# Patient Record
Sex: MALE | Race: WHITE | Employment: FULL TIME | URBAN - METROPOLITAN AREA
[De-identification: names, ages, dates, MRNs, and addresses within clinical notes are randomized per-mention and may not be internally consistent; named-entity substitution may affect disease eponyms.]

---

## 2018-02-06 ENCOUNTER — OFFICE VISIT (OUTPATIENT)
Dept: SLEEP MEDICINE | Facility: CLINIC | Age: 48
End: 2018-02-06
Payer: COMMERCIAL

## 2018-02-06 VITALS
WEIGHT: 208 LBS | DIASTOLIC BLOOD PRESSURE: 80 MMHG | RESPIRATION RATE: 16 BRPM | SYSTOLIC BLOOD PRESSURE: 124 MMHG | HEART RATE: 86 BPM | HEIGHT: 66 IN | BODY MASS INDEX: 33.43 KG/M2 | OXYGEN SATURATION: 94 %

## 2018-02-06 DIAGNOSIS — G47.33 OSA ON CPAP: Primary | ICD-10-CM

## 2018-02-06 PROCEDURE — 99214 OFFICE O/P EST MOD 30 MIN: CPT | Performed by: INTERNAL MEDICINE

## 2018-02-06 NOTE — NURSING NOTE
"Chief Complaint   Patient presents with     CPAP Follow Up       Initial /80  Pulse 86  Resp 16  Ht 1.676 m (5' 6\")  Wt 94.3 kg (208 lb)  SpO2 94%  BMI 33.57 kg/m2 Estimated body mass index is 33.57 kg/(m^2) as calculated from the following:    Height as of this encounter: 1.676 m (5' 6\").    Weight as of this encounter: 94.3 kg (208 lb).  Medication Reconciliation: complete     BRADLEY Oliva        "

## 2018-02-06 NOTE — PATIENT INSTRUCTIONS

## 2018-02-06 NOTE — MR AVS SNAPSHOT
After Visit Summary   2/6/2018    Ryan Caba    MRN: [id number]           Patient Information     Date Of Birth          1970        Visit Information        Provider Department      2/6/2018 9:30 AM Charlene Martin MD [facility], [place], Sleep Study        Today's Diagnoses     DAMARIS on CPAP    -  1      Care Instructions      Your BMI is Body mass index is 33.57 kg/(m^2).  Weight management is a personal decision.  If you are interested in exploring weight loss strategies, the following discussion covers the approaches that may be successful. Body mass index (BMI) is one way to tell whether you are at a healthy weight, overweight, or obese. It measures your weight in relation to your height.  A BMI of 18.5 to 24.9 is in the healthy range. A person with a BMI of 25 to 29.9 is considered overweight, and someone with a BMI of 30 or greater is considered obese. More than two-thirds of American adults are considered overweight or obese.  Being overweight or obese increases the risk for further weight gain. Excess weight may lead to heart disease and diabetes.  Creating and following plans for healthy eating and physical activity may help you improve your health.  Weight control is part of healthy lifestyle and includes exercise, emotional health, and healthy eating habits. Careful eating habits lifelong are the mainstay of weight control. Though there are significant health benefits from weight loss, long-term weight loss with diet alone may be very difficult to achieve- studies show long-term success with dietary management in less than 10% of people. Attaining a healthy weight may be especially difficult to achieve in those with severe obesity. In some cases, medications, devices and surgical management might be considered.  What can you do?  If you are overweight or obese and are interested in methods for weight loss, you should discuss this with your provider.     Consider  reducing daily calorie intake by 500 calories.     Keep a food journal.     Avoiding skipping meals, consider cutting portions instead.    Diet combined with exercise helps maintain muscle while optimizing fat loss. Strength training is particularly important for building and maintaining muscle mass. Exercise helps reduce stress, increase energy, and improves fitness. Increasing exercise without diet control, however, may not burn enough calories to loose weight.       Start walking three days a week 10-20 minutes at a time    Work towards walking thirty minutes five days a week     Eventually, increase the speed of your walking for 1-2 minutes at time    In addition, we recommend that you review healthy lifestyles and methods for weight loss available through the National Institutes of Health patient information sites:  http://win.niddk.nih.gov/publications/index.htm    And look into health and wellness programs that may be available through your health insurance provider, employer, local community center, or mitul club.    Weight management plan: Patient was referred to their PCP to discuss a diet and exercise plan.              Follow-ups after your visit        Follow-up notes from your care team     Return in about 1 year (around 2/6/2019).      Your next 10 appointments already scheduled     Feb 19, 2018  9:30 AM CST   Oximetry  with SLEEP STUDY RM 7   [facility], Sleep Study ([facility])    [address] [place] [id number] [phone]            Feb 20, 2018  8:00 AM CST   Oximetry Drop Off with DME SCHEDULE   [facility], Sleep Study ([facility])    [address] [place] [id number] [phone]              Future tests that were ordered for you today     Open Future Orders        Priority Expected Expires Ordered    Overnight oximetry study Routine  8/5/2018 2/6/2018           "  Who to contact     If you have questions or need follow up information about today's clinic visit or your schedule please contact [facility] directly at [phone].  Normal or non-critical lab and imaging results will be communicated to you by MyChart, letter or phone within 4 business days after the clinic has received the results. If you do not hear from us within 7 days, please contact the clinic through MyChart or phone. If you have a critical or abnormal lab result, we will notify you by phone as soon as possible.  Submit refill requests through Panjiva or call your pharmacy and they will forward the refill request to us. Please allow 3 business days for your refill to be completed.          Additional Information About Your Visit        Panjiva Information     Panjiva lets you send messages to your doctor, view your test results, renew your prescriptions, schedule appointments and more. To sign up, go to [email or web address] . Click on \"Log in\" on the left side of the screen, which will take you to the Welcome page. Then click on \"Sign up Now\" on the right side of the page.     You will be asked to enter the access code listed below, as well as some personal information. Please follow the directions to create your username and password.     Your access code is: [email or web address]  Expires: 2018 10:20 AM     Your access code will  in 90 days. If you need help or a new code, please call your [facility] clinic or [phone].        Care EveryWhere ID     This is your Care EveryWhere ID. This could be used by other organizations to access your [facility] medical records  [id number]        Your Vitals Were     Pulse Respirations Height Pulse Oximetry BMI (Body Mass Index)       86 16 1.676 m (5' 6\") 94% 33.57 kg/m2        Blood Pressure from Last 3 Encounters:   18 124/80   10/27/15 127/74   10/13/15 125/80    Weight from Last 3 Encounters:   18 94.3 kg (208 lb)   10/13/15 " 89.8 kg (198 lb)              We Performed the Following     Comprehensive DME        Primary Care Provider Fax #    Physician No Ref-Primary [phone]       No address on file        Equal Access to Services     ANGEL SLAUGHTERHARSHA : Sebastian mitchell michael yanick Sweeney, jonnathan whatley, aurelia zuñiga, darryl lomeli. So [facility] [phone].    ATENCIÓN: Si habla español, tiene a chavez disposición servicios gratuitos de asistencia lingüística. Llame al [phone].    We comply with applicable federal civil rights laws and [place] laws. We do not discriminate on the basis of race, color, national origin, age, disability, sex, sexual orientation, or gender identity.            Thank you!     Thank you for choosing [facility] [place], SLEEP STUDY  for your care. Our goal is always to provide you with excellent care. Hearing back from our patients is one way we can continue to improve our services. Please take a few minutes to complete the written survey that you may receive in the mail after your visit with us. Thank you!             Your Updated Medication List - Protect others around you: Learn how to safely use, store and throw away your medicines at www.disposemymeds.org.          This list is accurate as of 2/6/18 10:21 AM.  Always use your most recent med list.                   Brand Name Dispense Instructions for use Diagnosis    order for DME      Equipment being ordered: CPAP Patient Ryan Goodetheresa was set up at [facility] on October 29, 2015. Patient received a Resmed AirSense 10 Auto. Pressures were set at Auto 7 - 15 cm H2O.   Patient?s ramp is 5 cm H2O for Auto and FLEX/EPR is A Flex.  Patient received a Resmed Mask name: PENA FX  Pillow mask Size Large, heated tubing and heated humidifier.  Patient is enrolled in the STM Program and does need to meet compliance. Patient has a follow up on TBD with Dr. Martin.  Haylie Wynn

## 2018-02-06 NOTE — PROGRESS NOTES
"   SLEEP MEDICINE FOLLOW-UP VISIT      DATE OF VISIT:  02/06/2018       CHIEF COMPLAINT / PURPOSE OF VISIT:  Followup of obstructive sleep apnea (DAMARIS).      HISTORY OF PRESENT ILLNESS:  Mr. Ryan Caba is a 47-year-old male who presents to the Sleep Clinic today for followup of DAMARIS.  He underwent HSP on 10/26/2016.  At that time his BMI was 32 kg/m2.  He was noted to have an apnea-hypopnea index (AHI) of 13.0 per hour, 14.2 per hour in supine sleep position.  There was also evidence of mild sleep-associated hypoxemia during the HST, he spent in 6.2 minutes with oxygen saturations below 88%.  He was subsequently prescribed an auto-CPAP device, which he continues to use during sleep, and he is here today for a followup of DAMARIS.  He is currently being treated with auto-CPAP with pressure settings between 7 to 15 cm of water.  He reports using the CPAP regularly during sleep.  There have not been any concerns about snoring or awakenings due to gasping for air or choking sensation while he is using the CPAP.  He denies air hunger.  He reports good sleep quality with the continued use of the device.  He denies fatigue or excessive daytime sleepiness.  He denies any drowsiness while driving.  He endorses an [place] Sleepiness score of 1 out of 24.    Downloadable compliance data for the past one month shows that he has used the device for 30 out of 30 days with an average daily usage of 7 hours and 26 minutes on the nights used.  Median pressure was 10.2 cm of water, 95th percentile was 13.7 cm of water, and max pressure was 14.6 cm of water.  The 95th percentile air leak was 17.5 liters per minute, residual AHI was 1.7 per hour.     Current meds, Past medical history, Past surgical history, Allergies, Social history, Family history: reviewed, per EMR    PHYSICAL EXAMINATION:   VITAL SIGNS:/80  Pulse 86  Resp 16  Ht 1.676 m (5' 6\")  Wt 94.3 kg (208 lb)  SpO2 94%  BMI 33.57 kg/m2  GENERAL APPEARANCE:  Normal, " "in no apparent cardiopulmonary distress.   CARDIOVASCULAR:  Regular S1 and S2.  No gallops or murmurs.   RESPIRATORY:  Clear to auscultation bilaterally with no rales or rhonchi.      IMPRESSION / REPORT / PLAN:   Previously diagnosed mild obstructive sleep apnea.  The patient reports adequate compliance with the CPAP device, and reports positive benefits from the device usage.  Based on the compliance measures, obstructive sleep apnea appears to be adequately controlled with the CPAP at the current pressure setting.  However, based on the compliance measures, even though he does not report air hunger, I recommended increasing the minimum pressure setting to 9 cm of water and the max pressure to 16 cm of water.  He was instructed to get back to me in case he has any trouble tolerating the revised pressure settings and he agreed.     Recommended obtaining overnight oximetry(JUDY)  with revised CPAP settings along with parallel compliance DL to check for resolution of hypoxemia which was noted during the HST. Plan to call pt to discuss results of JUDY pt was okay with leaving voice message on his cell phone [phone].    Prescription was provided for renewal of all the CPAP supplies.  He was recommended to continue using the CPAP regularly during sleep, and was instructed to get the supplies for the device regularly replaced as well.  We discussed weight management with diet and exercise.  He was recommended to avoid driving or operating heavy machinery if drowsy or sleepy, to prevent accidents.       If the oximetry is normal, the plan is for him to follow up at the Sleep Clinic in one year, or sooner if there are any concerns, and he agreed.      The above note was dictated using voice recognition software. Although reviewed after completion, some word and grammatical error may remain . Please contact the author for any clarifications.    \"I spent a total of 25  minutes face to face with Ryan Caba during " "today's office visit. Over 50% of this time was spent counseling the patient and  coordinating care regarding sleep apnea, CPAP treatment, and weight management.\"       Charlene Martin MD   of Medicine,  Division of Pulmonary/Sleep Medicine  [facility].      D: 2018   T: 2018   MT: KRISTAL      Name:     DENISE MCCANN   MRN:      [id number]        Account:      [id number]   :      1970           Visit Date:   2018      Document: [id number]      "

## 2018-02-20 ENCOUNTER — DOCUMENTATION ONLY (OUTPATIENT)
Dept: SLEEP MEDICINE | Facility: CLINIC | Age: 48
End: 2018-02-20

## 2018-02-20 ENCOUNTER — OFFICE VISIT (OUTPATIENT)
Dept: SLEEP MEDICINE | Facility: CLINIC | Age: 48
End: 2018-02-20
Attending: INTERNAL MEDICINE
Payer: COMMERCIAL

## 2018-02-20 DIAGNOSIS — G47.33 OSA ON CPAP: ICD-10-CM

## 2018-02-20 NOTE — PROGRESS NOTES
Patient presented to clinic for  and demonstration of the overnight oximetry. Patient was set up and instructed use. Patient verbalized understanding and will be returning device by 10 am tomorrow    Patient was given sleep logs and written instructions for use    BRADLEY Shah  Sleep Clinic-Specialist,    Registered Medical Assistant   [facility]

## 2018-02-20 NOTE — MR AVS SNAPSHOT
"              After Visit Summary   2/20/2018    Ryan Caba    MRN: [id number]           Patient Information     Date Of Birth          1970        Visit Information        Provider Department      2/20/2018 10:00 AM [facility] STUDY RM [facility]        Today's Diagnoses     DAMARIS on CPAP           Follow-ups after your visit        Your next 10 appointments already scheduled     Feb 21, 2018  8:45 AM CST   Oximetry Drop Off with DME SCHEDULE   [facility], Sleep Study ([facility])    [address] [place] [id number] [phone]            Feb 27, 2018  9:30 AM CST   Return Sleep Patient with Charlene Martin MD   [facility], Sleep Study ([facility])    [address] [place] [id number] [phone]              Who to contact     If you have questions or need follow up information about today's clinic visit or your schedule please contact [facility] directly at [phone].  Normal or non-critical lab and imaging results will be communicated to you by Tus reQRdoshart, letter or phone within 4 business days after the clinic has received the results. If you do not hear from us within 7 days, please contact the clinic through EdgeConneXt or phone. If you have a critical or abnormal lab result, we will notify you by phone as soon as possible.  Submit refill requests through HammerKit or call your pharmacy and they will forward the refill request to us. Please allow 3 business days for your refill to be completed.          Additional Information About Your Visit        Tus reQRdosharStockStreams Information     HammerKit lets you send messages to your doctor, view your test results, renew your prescriptions, schedule appointments and more. To sign up, go to [email or web address] . Click on \"Log in\" on the left side of the screen, which will take you to the Welcome page. " "Then click on \"Sign up Now\" on the right side of the page.     You will be asked to enter the access code listed below, as well as some personal information. Please follow the directions to create your username and password.     Your access code is: [email or web address]  Expires: 2018 10:20 AM     Your access code will  in 90 days. If you need help or a new code, please call your [facility] clinic or [phone].        Care EveryWhere ID     This is your Care EveryWhere ID. This could be used by other organizations to access your [facility] medical records  [id number]         Blood Pressure from Last 3 Encounters:   18 124/80   10/27/15 127/74   10/13/15 125/80    Weight from Last 3 Encounters:   18 94.3 kg (208 lb)   10/13/15 89.8 kg (198 lb)              We Performed the Following     Overnight oximetry study        Primary Care Provider Fax #    Physician No Ref-Primary [phone]       No address on file        Equal Access to Services     [facility]: Hadii aad ku hadasho Sochiquis, waaxda luqadaha, qaybta kaalmada adeegyaenio, darryl kruger . So [facility] [phone].    ATENCIÓN: Si habla español, tiene a chavez disposición servicios gratuitos de asistencia lingüística. Llame al [phone].    We comply with applicable federal civil rights laws and [place] laws. We do not discriminate on the basis of race, color, national origin, age, disability, sex, sexual orientation, or gender identity.            Thank you!     Thank you for choosing [facility], SLEEP STUDY  for your care. Our goal is always to provide you with excellent care. Hearing back from our patients is one way we can continue to improve our services. Please take a few minutes to complete the written survey that you may receive in the mail after your visit with us. Thank you!             Your Updated Medication List - Protect others around you: Learn how to safely use, store and throw away your medicines at " www.disposemymeds.org.          This list is accurate as of 2/20/18 10:08 AM.  Always use your most recent med list.                   Brand Name Dispense Instructions for use Diagnosis    order for DME      Equipment being ordered: CPAP Patient Ryan Caba was set up at [facility] on October 29, 2015. Patient received a Resmed AirSense 10 Auto. Pressures were set at Auto 7 - 15 cm H2O.   Patient?s ramp is 5 cm H2O for Auto and FLEX/EPR is A Flex.  Patient received a Resmed Mask name: PENA FX  Pillow mask Size Large, heated tubing and heated humidifier.  Patient is enrolled in the STM Program and does need to meet compliance. Patient has a follow up on TBD with Dr. Martin.  Haylie Wynn

## 2018-02-21 ENCOUNTER — DOCUMENTATION ONLY (OUTPATIENT)
Dept: SLEEP MEDICINE | Facility: CLINIC | Age: 48
End: 2018-02-21
Payer: COMMERCIAL

## 2018-02-21 NOTE — PROGRESS NOTES
Results received from [employer] for overnight oximetry that was picked up on 02/20/2018.     Results labeled and scanned into chart.     Copy given to provider for review. Encounter also forwarded to provider.     Recording Date: 02/20/2018    Duration: 7:45:24    Note: Completed on Current PAP Settings.     Compliance download included as well.

## 2018-02-26 NOTE — PROCEDURES
Overnight oximetry was done on 2/20/18 with  Auto CPAP 7-16 cm water. (valid recording time: 7 hours and 45 minutes)  The oximetry was essentially normal. Baseline oxygen saturation was 94.1%, lowest oxygen saturation was 87%. Time  with oxygen saturations below 88% was 0.1 minutes.   Based on the parallel compliance DL (AHI 1.9 per hr) with  the current APAP setting, and the oximetry findings, the sleep disordered breathing is  adequately controlled.  Recommendations:   If patient is comfortable with the current pressure settings and does  not report air hunger, plan will be to to continue using the CPAP regularly during sleep and get regular equipment replacement.   The minimum pressure settings will be increased if patient  reports air hunger, and  he will continue using the CPAP regularly during sleep and get regular equipment replacement.     F/U on yearly basis or sooner  if there is problems with device usage/recurrence of snoring or fatigue or excessive daytime sleepiness.    Charlene Martin MD    of Medicine,   Division of Pulmonary/Sleep Medicine   [facility]

## 2019-04-29 ENCOUNTER — OFFICE VISIT (OUTPATIENT)
Dept: SLEEP MEDICINE | Facility: CLINIC | Age: 49
End: 2019-04-29
Payer: COMMERCIAL

## 2019-04-29 VITALS
SYSTOLIC BLOOD PRESSURE: 132 MMHG | BODY MASS INDEX: 32.3 KG/M2 | OXYGEN SATURATION: 93 % | DIASTOLIC BLOOD PRESSURE: 85 MMHG | HEART RATE: 101 BPM | HEIGHT: 66 IN | RESPIRATION RATE: 20 BRPM | WEIGHT: 201 LBS

## 2019-04-29 DIAGNOSIS — G47.33 OSA ON CPAP: Primary | ICD-10-CM

## 2019-04-29 PROCEDURE — 99214 OFFICE O/P EST MOD 30 MIN: CPT | Performed by: INTERNAL MEDICINE

## 2019-04-29 ASSESSMENT — MIFFLIN-ST. JEOR: SCORE: 1729.23

## 2019-04-29 NOTE — PATIENT INSTRUCTIONS
Your BMI is Body mass index is 32.46 kg/m .  Weight management is a personal decision.  If you are interested in exploring weight loss strategies, the following discussion covers the approaches that may be successful. Body mass index (BMI) is one way to tell whether you are at a healthy weight, overweight, or obese. It measures your weight in relation to your height.  A BMI of 18.5 to 24.9 is in the healthy range. A person with a BMI of 25 to 29.9 is considered overweight, and someone with a BMI of 30 or greater is considered obese. More than two-thirds of American adults are considered overweight or obese.  Being overweight or obese increases the risk for further weight gain. Excess weight may lead to heart disease and diabetes.  Creating and following plans for healthy eating and physical activity may help you improve your health.  Weight control is part of healthy lifestyle and includes exercise, emotional health, and healthy eating habits. Careful eating habits lifelong are the mainstay of weight control. Though there are significant health benefits from weight loss, long-term weight loss with diet alone may be very difficult to achieve- studies show long-term success with dietary management in less than 10% of people. Attaining a healthy weight may be especially difficult to achieve in those with severe obesity. In some cases, medications, devices and surgical management might be considered.  What can you do?  If you are overweight or obese and are interested in methods for weight loss, you should discuss this with your provider.     Consider reducing daily calorie intake by 500 calories.     Keep a food journal.     Avoiding skipping meals, consider cutting portions instead.    Diet combined with exercise helps maintain muscle while optimizing fat loss. Strength training is particularly important for building and maintaining muscle mass. Exercise helps reduce stress, increase energy, and improves fitness.  Increasing exercise without diet control, however, may not burn enough calories to loose weight.       Start walking three days a week 10-20 minutes at a time    Work towards walking thirty minutes five days a week     Eventually, increase the speed of your walking for 1-2 minutes at time    In addition, we recommend that you review healthy lifestyles and methods for weight loss available through the National Institutes of Health patient information sites:  http://win.niddk.nih.gov/publications/index.htm    And look into health and wellness programs that may be available through your health insurance provider, employer, local community center, or mitul club.    Weight management plan: Patient was referred to their PCP to discuss a diet and exercise plan.

## 2019-04-29 NOTE — NURSING NOTE
Pressure changed done to a new setting of 8 Min and 16 Max.    All other orders sent to [facility]    Selin Rodrigez [facility] ~[place]

## 2019-04-29 NOTE — PROGRESS NOTES
SLEEP MEDICINE FOLLOW-UP VISIT      DATE OF VISIT:   April 29, 2019     PURPOSE OF VISIT:  Followup of obstructive sleep apnea (DAMARIS) /Review CPAP compliance measures     HISTORY OF PRESENT ILLNESS:  Mr. Ryan Caba is a 48-year-old male who presents to the Sleep Clinic today for followup of DAMARIS.   ( HST on 10/26/2016 that showed an apnea-hypopnea index (AHI) of 13.0 per hour, 14.2 per hour in supine sleep position.  There was also evidence of mild sleep-associated hypoxemia during the HST, he spent in 6.2 minutes with oxygen saturations below 88%, which resolved with CPAP treatment based on the overnight oximetry obtained on 2/20/2018).     He is currently being treated with auto-CPAP with pressure settings between 7 to 15 cm of water.  He reports using the CPAP regularly during sleep.  There have not been any concerns about snoring or awakenings due to gasping for air or choking sensation with the CPAP.  He denies air hunger.  He reports good sleep quality with the  device.  He denies fatigue or excessive daytime sleepiness.  He denies any drowsiness while driving.  He endorses an [place] Sleepiness score of 2 out of 24.      Downloadable compliance data for the past one month shows that he has used the device for 29 out of 30 days with an average daily usage of 7 hours and 17 minutes on the nights used.  Median pressure was 10 cm of water, 95th percentile was 12.8 cm of water, and max pressure was 14.4 cm of water.  The 95th percentile air leak was 22.3 liters per minute, residual AHI was 2.2 per hour.       Current meds:  Current Outpatient Medications   Medication Sig Dispense Refill     order for DME Equipment being ordered: CPAP  Patient Ryan Caba was set up at [place] on October 29, 2015. Patient received a Resmed AirSense 10 Auto. Pressures were set at Auto 7 - 15 cm H2O.   Patient s ramp is 5 cm H2O for Auto and FLEX/EPR is A Flex.  Patient received a Resmed Mask name: PENA FX  Pillow mask  "Size Large, heated tubing and heated humidifier.  Patient is enrolled in the STM Program and does need to meet compliance. Patient has a follow up on TBD with Dr. Martin.   Haylie Wynn       Past medical history, Past surgical history, Allergies, Social history, Family history: reviewed, per EMR     PHYSICAL EXAMINATION:   VITAL SIGNS:/85   Pulse 101   Resp 20   Ht 1.676 m (5' 5.98\")   Wt 91.2 kg (201 lb)   SpO2 93%   BMI 32.46 kg/m    GENERAL APPEARANCE: Well-built, well-nourished,  in no apparent distress  Pt is dressed casually, cooperative with good eye contact.   Speech is spontaneous with regular rate and volume.   Mood: euthymic; affect congruent with full range and intensity.   Sensorium: awake, alert and oriented to person, place, time, and situation.    IMPRESSION / REPORT / PLAN:   Previously diagnosed mild obstructive sleep apnea.  The patient reports good compliance with the CPAP device, and reports positive benefits from the device usage.  Based on the compliance measures, obstructive sleep apnea is well controlled with the CPAP at the current pressure settings.  However, based on the compliance measures, even though he does not report air hunger, I have recommended increasing the minimum pressure setting to 8 cm of water and the max pressure to 16 cm of water. He was instructed to get back to me in case he has any trouble tolerating the revised pressure settings.    Prescription was provided for renewal of all the CPAP supplies.  He was recommended to continue using the CPAP regularly during sleep, and was instructed to get the supplies for the device regularly replaced as well.    We discussed weight management with diet and exercise.    He was recommended to avoid driving or operating heavy machinery if drowsy or sleepy, to prevent accidents.    He will  follow up at the Sleep Clinic in one year, or sooner if there are any concerns.      The above note was dictated using " "voice recognition software. Although reviewed after completion, some word and grammatical error may remain . Please contact the author for any clarifications.     \"I spent a total of 25  minutes face to face with Ryan Caba during today's office visit. Over 50% of this time was spent counseling the patient and  coordinating care regarding sleep apnea, CPAP treatment\".         Charlene Martin MD   of Medicine,  Division of Pulmonary/Sleep Medicine  [facility].  "

## 2019-04-29 NOTE — NURSING NOTE
04/29/2019, 08:11 AM Settings requested by Selin Rodrigez   Set Mode to AutoSet   Set Min Pressure to 8.0 cmH2O   Set Max Pressure to 16.0 cmH2O   Set EPR to Fulltime   Set EPR level to 3   Set Ramp enable to Auto   Set Ramp time to 20 min   Set Start pressure to 5.0 cmH2O    Previous Settings   Set EPR level to 3   Set Mode to AutoSet   Set Min Pressure to 7.0 cmH2O   Set Max Pressure to 15.0 cmH2O   Set EPR to Fulltime   Set Ramp enable to Auto   Set Ramp time to 20 min   Set Start pressure to 5.0 cmH2O

## 2019-05-03 ENCOUNTER — DOCUMENTATION ONLY (OUTPATIENT)
Dept: SLEEP MEDICINE | Facility: CLINIC | Age: 49
End: 2019-05-03
Payer: COMMERCIAL

## 2019-05-03 NOTE — PROGRESS NOTES
Patient was seen with me today here in [place] on 05/03/19 regarding mask fitting. He would like to try on the Resmed Airfit P30i pillow mask. He stated that Resmed sent an email out to him about this mask. I let him know we don't have them in stock yet but will be getting some in soon. He asked if he can buy it elsewhere and I told him he can look online like cpap.com. I apologized to him that he came out here to do a mask fitting but we don't have the mask he would like to try on. He said it was ok and decided that he will take a new mask of the current one he's using now along with tubing, filters, and an extra medium size pillow. I let him know in 3 months when he's eligible to get a new mask to call and check with us to see if we have the Airfit P30i pillow mask in stock. He was ok with that.

## 2020-04-09 VITALS — WEIGHT: 190 LBS | HEIGHT: 66 IN | BODY MASS INDEX: 30.53 KG/M2

## 2020-04-09 ASSESSMENT — MIFFLIN-ST. JEOR: SCORE: 1674.33

## 2020-04-09 NOTE — PROGRESS NOTES
"Ryan Peterson is a 49 year old male who is being evaluated via a billable telephone visit.      The patient has been notified of following:     \"This telephone visit will be conducted via a call between you and your physician/provider. We have found that certain health care needs can be provided without the need for a physical exam.  This service lets us provide the care you need with a short phone conversation.  If a prescription is necessary we can send it directly to your pharmacy.  If lab work is needed we can place an order for that and you can then stop by our lab to have the test done at a later time.    Telephone visits are billed at different rates depending on your insurance coverage. During this emergency period, for some insurers they may be billed the same as an in-person visit.  Please reach out to your insurance provider with any questions.    If during the course of the call the physician/provider feels a telephone visit is not appropriate, you will not be charged for this service.\"    Patient has given verbal consent for Telephone visit?  Yes    How would you like to obtain your AVS? Mail a copy    Additional provider notes:   SLEEP MEDICINE FOLLOW-UP VISIT      DATE OF VISIT:   April 10, 2020      PURPOSE OF VISIT:  Followup of obstructive sleep apnea (DAMARIS) /Review CPAP compliance measures     HISTORY OF PRESENT ILLNESS:  Mr. Ryan Caba is a 49-year-old male with h/o DAMARIS diagnosed during HST obtained in 2016 with AH of 13 /hr. He is currently being treated with auto CPAP with pressure settings between 8 to 16 cm of water. Telephone visit is scheduled today  for f/u DAMARIS and to review CPAP compliance measures    He reports using the CPAP regularly during sleep. He uses nasal pillow mask and denies interface concerns. There have not been any concerns about snoring or awakenings due to gasping for air or choking sensation with the CPAP.  He denies air hunger.  He reports good sleep quality " with the  device.  He denies fatigue or excessive daytime sleepiness.  He denies any drowsiness while driving.      Sleep scales:  [place] Sleepiness score of 0 out of 24.  Insomnia severity index:0      Workdays: Bed time:  8-830 pm and  wake time:4AM  Weekends Bed time:  9 pm and  wake time:6 AM  On an average he reports getting 7.5hrs of sleep /night.    Downloadable compliance data for the past one month shows that he has used the device for 30 out of 30 days with an average daily usage of 7 hours and 31 minutes on the nights used.  95th percentile was 11.1 cm of water.  The 95th percentile air leak was 18.5 liters per minute, residual AHI was 1.4 per hour.     Previous sleep study:  HST on 10/26/2016 that showed an apnea-hypopnea index (AHI) of 13.0 per hour, 14.2 per hour in supine sleep position.  There was also evidence of mild sleep-associated hypoxemia during the HST, he spent in 6.2 minutes with oxygen saturations below 88%, which resolved with CPAP treatment based on the overnight oximetry obtained on 2/20/2018.       Current meds:  Current Outpatient Medications   Medication Sig Dispense Refill     order for DME Equipment being ordered: CPAP  Patient Ryan Caba was set up at [place] on October 29, 2015. Patient received a Resmed AirSense 10 Auto. Pressures were set at Auto 7 - 15 cm H2O.   Patient s ramp is 5 cm H2O for Auto and FLEX/EPR is A Flex.  Patient received a Resmed Mask name: PENA FX  Pillow mask Size Large, heated tubing and heated humidifier.  Patient is enrolled in the STM Program and does need to meet compliance. Patient has a follow up on TBD with Dr. Martin.   Haylie Wynn         Past medical history, Past surgical history, Allergies, Social history, Family history: reviewed, per EMR    ASSESSMENT/ PLAN:    Mild obstructive sleep apnea.  The patient reports good compliance with the CPAP device, and positive benefits with CPAP use.  Based on the compliance  "measures, obstructive sleep apnea is well controlled with the CPAP at the current pressure settings.  Prescription was provided for renewal of all the CPAP supplies.  He was recommended to continue using the CPAP regularly during sleep, and was instructed to get the supplies for the device regularly replaced as well.    We discussed weight management with diet and exercise.      He was recommended to avoid driving or operating heavy machinery if drowsy or sleepy, to prevent accidents.      He will  follow up at the Sleep Clinic in one year, or sooner if there are any concerns.      The above note was dictated using voice recognition software. Although reviewed after completion, some word and grammatical error may remain . Please contact the author for any clarifications.     Phone call duration: 15 minutes  Start time:10:05AM   End time:10:20 AM    \"I spent a total of 15  minutes with Ryan Caba during today's telephone  visit. Over 50% of this time was spent counseling the patient and  coordinating care regarding sleep apnea, CPAP treatment\".      Charlene Martin MD  [facility]   [address], [address]   [address] [place] [address]. [place] [address]   Appointments: [phone]'            "

## 2020-04-10 ENCOUNTER — VIRTUAL VISIT (OUTPATIENT)
Dept: SLEEP MEDICINE | Facility: CLINIC | Age: 50
End: 2020-04-10
Payer: COMMERCIAL

## 2020-04-10 DIAGNOSIS — G47.33 OSA ON CPAP: Primary | ICD-10-CM

## 2020-04-10 PROCEDURE — 99213 OFFICE O/P EST LOW 20 MIN: CPT | Mod: 95 | Performed by: INTERNAL MEDICINE

## 2020-04-13 ENCOUNTER — TELEPHONE (OUTPATIENT)
Dept: SLEEP MEDICINE | Facility: CLINIC | Age: 50
End: 2020-04-13

## 2020-04-13 NOTE — TELEPHONE ENCOUNTER
I called patient today 04/13/2020 at 11:15 am to see if he would like to renew pap supplies. No answer, left message for patient to call our main location in [facility] to reorder.

## 2021-06-14 DIAGNOSIS — G47.33 OSA (OBSTRUCTIVE SLEEP APNEA): Primary | ICD-10-CM

## 2022-08-03 DIAGNOSIS — G47.33 OBSTRUCTIVE SLEEP APNEA (ADULT) (PEDIATRIC): Primary | ICD-10-CM

## 2023-09-05 ENCOUNTER — TELEPHONE (OUTPATIENT)
Dept: SCHEDULING | Facility: CLINIC | Age: 53
End: 2023-09-05

## 2023-09-05 DIAGNOSIS — G47.33 OBSTRUCTIVE SLEEP APNEA (ADULT) (PEDIATRIC): Primary | ICD-10-CM

## 2023-09-05 NOTE — TELEPHONE ENCOUNTER
Spoke with patient. Advised ok to keep using machine. Patient verbalized understanding.     Jacqueline KHAN RN  [facility]

## 2023-10-07 ENCOUNTER — HEALTH MAINTENANCE LETTER (OUTPATIENT)
Age: 53
End: 2023-10-07

## 2023-12-04 ENCOUNTER — VIRTUAL VISIT (OUTPATIENT)
Dept: SLEEP MEDICINE | Facility: CLINIC | Age: 53
End: 2023-12-04
Payer: MEDICAID

## 2023-12-04 VITALS — HEIGHT: 66 IN | BODY MASS INDEX: 30.53 KG/M2 | WEIGHT: 190 LBS

## 2023-12-04 DIAGNOSIS — G47.33 OSA (OBSTRUCTIVE SLEEP APNEA): Primary | ICD-10-CM

## 2023-12-04 PROCEDURE — 99203 OFFICE O/P NEW LOW 30 MIN: CPT | Mod: VID | Performed by: PHYSICIAN ASSISTANT

## 2023-12-04 ASSESSMENT — SLEEP AND FATIGUE QUESTIONNAIRES
HOW LIKELY ARE YOU TO NOD OFF OR FALL ASLEEP WHILE SITTING INACTIVE IN A PUBLIC PLACE: WOULD NEVER DOZE
HOW LIKELY ARE YOU TO NOD OFF OR FALL ASLEEP WHILE LYING DOWN TO REST IN THE AFTERNOON WHEN CIRCUMSTANCES PERMIT: MODERATE CHANCE OF DOZING
HOW LIKELY ARE YOU TO NOD OFF OR FALL ASLEEP WHEN YOU ARE A PASSENGER IN A CAR FOR AN HOUR WITHOUT A BREAK: WOULD NEVER DOZE
HOW LIKELY ARE YOU TO NOD OFF OR FALL ASLEEP WHILE WATCHING TV: SLIGHT CHANCE OF DOZING
HOW LIKELY ARE YOU TO NOD OFF OR FALL ASLEEP WHILE SITTING QUIETLY AFTER LUNCH WITHOUT ALCOHOL: WOULD NEVER DOZE
HOW LIKELY ARE YOU TO NOD OFF OR FALL ASLEEP IN A CAR, WHILE STOPPED FOR A FEW MINUTES IN TRAFFIC: WOULD NEVER DOZE
HOW LIKELY ARE YOU TO NOD OFF OR FALL ASLEEP WHILE SITTING AND TALKING TO SOMEONE: WOULD NEVER DOZE
HOW LIKELY ARE YOU TO NOD OFF OR FALL ASLEEP WHILE SITTING AND READING: SLIGHT CHANCE OF DOZING

## 2023-12-04 ASSESSMENT — PAIN SCALES - GENERAL: PAINLEVEL: NO PAIN (0)

## 2023-12-04 NOTE — NURSING NOTE
Is the patient currently in the state of [place]? YES    Visit mode:VIDEO    If the visit is dropped, the patient can be reconnected by: VIDEO VISIT: Text to cell phone:   Telephone Information:   Mobile [phone]       Will anyone else be joining the visit? NO  (If patient encounters technical issues they should call [phone])    How would you like to obtain your AVS? MyChart    Are changes needed to the allergy or medication list? No    Reason for visit: Consult    Alirio BANDA

## 2023-12-04 NOTE — PROGRESS NOTES
Virtual Visit Details    Type of service:  Video Visit     Originating Location (pt. Location): Home    Distant Location (provider location):  On-site  Platform used for Video Visit: [facility]    Outpatient Sleep Medicine Consultation:      Name: Ryan Peterson MRN# [id number]   Age: 53 year old YOB: 1970     Date of Consultation: December 4, 2023  Consultation is requested by: No referring provider defined for this encounter. No ref. provider found  Primary care provider: No Ref-Primary, Physician       Reason for Sleep Consult:       Patient s Reason for visit  Ryan Peterson main reason for visit:  Re-establish care  Patient states problem(s) started:    Ryan Pteerson's goals for this visit:   Obtain a new CPAP           Assessment and Plan:     Summary Sleep Diagnoses & Recommendations:   Mild obstructive sleep apnea-  Excellent CPAP compliance and AHI is well controlled on CPAP 8-16 cm/H20. Daytime symptoms are improved.   Continue current treatment.   He needs a new CPAP  Comprehensive DME order placed.    Follow up in 3 months if compliance review is required. If not, follow up in 2 years.     Comorbid Diagnoses:  Obesity      Summary Recommendations:  Orders Placed This Encounter   Procedures    Comprehensive DME       Summary Counseling:    Obstructive Sleep Apnea Reviewed  Complications of Untreated Sleep Apnea Reviewed      Medical Decision-making:   Educational materials provided in instructions    Total time spent reviewing medical records, history and physical examination, review of previous testing and interpretation as well as documentation on this date:30 minutes         History of Present Illness:     Chief Complaint   Patient presents with    Consult     Re-establish care. Last sleep medicine follow up was in 2019       Ryan Peterson is a 53 year old with history of mild obstructive sleep apnea. He wants to obtain a new machine. His current CPAP is about 8 years old  and exhibits warning that machine life is exceeded.     HST on 10/26/2016 that showed an apnea-hypopnea index (AHI) of 13.0 per hour, 14.2 per hour in supine sleep position.  There was also evidence of mild sleep-associated hypoxemia during the HST, he spent in 6.2 minutes with oxygen saturations below 88%, which resolved with CPAP treatment based on the overnight oximetry obtained on 2/20/2018.        Cpap dme fvhme    Do you use a CPAP Machine at home:  yes  Overall, on a scale of 0-10 how would you rate your CPAP (0 poor, 10 great):  10    What type of mask do you use:  nasal pillow  Is your mask comfortable:    If not, why:      Is your mask leaking:  no  If yes, where do you feel it:    How many night per week does the mask leak (0-7):      Do you notice snoring with mask on:  no  Do you notice gasping arousals with mask on:  no  Are you having significant oral or nasal dryness:  no  Is the pressure setting comfortable:  yes  If not, why:      What is your typical bedtime:  930-1030 pm  How long does it take you to go to sleep on PAP therapy:  minutes  What time do you typically get out of bed for the day:  5 am  How many hours on average per night are you using PAP therapy:  7.5-8  How many hours are you sleeping per night:  7.5-8 hours  Do you feel well rested in the morning:  yes      ResMed   Auto-PAP 8 - 16 cmH2O 30 day usage data:    100% of days with > 4 hours of use. 0/90 days with no use.   Average use 7 hours and 42 minutes per day.   95%ile Leak 19.8 L/min.   CPAP 95% pressure 11.8 cm.   AHI 2 events per hour.     SCALES:    [place]WORTH SLEEPINESS SCALE         12/4/2023    10:37 AM    [place] Sleepiness Scale ( MICK Orta  [id number]<br>ESS - [place]/English - Final version - 21 Nov 07 - [facility] [employer] Research [place].)   Sitting and reading Slight chance of dozing   Watching TV Slight chance of dozing   Sitting, inactive in a public place (e.g. a theatre or a meeting) Would never doze   As a passenger in a car for  "an hour without a break Would never doze   Lying down to rest in the afternoon when circumstances permit Moderate chance of dozing   Sitting and talking to someone Would never doze   Sitting quietly after a lunch without alcohol Would never doze   In a car, while stopped for a few minutes in traffic Would never doze   [place] Score (MC) 4   [place] Score (Sleep) 4         INSOMNIA SEVERITY INDEX (OJ)          12/4/2023    10:36 AM   Insomnia Severity Index (OJ)   Difficulty falling asleep 0   Difficulty staying asleep 0   Problems waking up too early 0   How SATISFIED/DISSATISFIED are you with your CURRENT sleep pattern? 1   How NOTICEABLE to others do you think your sleep problem is in terms of impairing the quality of your life? 0   How WORRIED/DISTRESSED are you about your current sleep problem? 0   To what extent do you consider your sleep problem to INTERFERE with your daily functioning (e.g. daytime fatigue, mood, ability to function at work/daily chores, concentration, memory, mood, etc.) CURRENTLY? 0   OJ Total Score 1       Guidelines for Scoring/Interpretation:  Total score categories:  0-7 = No clinically significant insomnia   8-14 = Subthreshold insomnia   15-21 = Clinical insomnia (moderate severity)  22-28 = Clinical insomnia (severe)  Used via courtesy of www.[facility].va.gov with permission from Mike Villa PhD., [facility]      STOP BANG         12/4/2023    10:34 AM   STOP BANG Questionnaire (  2008, the American Society of Anesthesiologists, Inc. [employer]   BMI Clinic: 30.67         GAD7         No data to display                  CAGE-AID         No data to display                CAGE-AID reprinted with permission from the [place] Medical Journal, HAFSA Garcia. and CHARY Richey, \"Conjoint screening questionnaires for alcohol and drug abuse\" [place] Medical Journal 94: 135-140, 1995.      PATIENT HEALTH QUESTIONNAIRE-9 (PHQ - 9)         No data to display "                Developed by Nia Villalba, Alona Woo, Lakhwinder Espinoza and colleagues, with an educational chantel from [employer] No permission required to reproduce, translate, display or distribute.        Allergies:    No Known Allergies    Medications:    Current Outpatient Medications   Medication Sig Dispense Refill    order for DME Equipment being ordered: CPAP  Patient Ryan Caba was set up at [place] on October 29, 2015. Patient received a Resmed AirSense 10 Auto. Pressures were set at Auto 7 - 15 cm H2O.   Patient s ramp is 5 cm H2O for Auto and FLEX/EPR is A Flex.  Patient received a Resmed Mask name: PENA FX  Pillow mask Size Large, heated tubing and heated humidifier.  Patient is enrolled in the STM Program and does need to meet compliance. Patient has a follow up on TBD with Dr. Martin.   Hayliefrantz Wynn         Problem List:  Patient Active Problem List    Diagnosis Date Noted    DAMARIS (obstructive sleep apnea) 12/04/2023     Priority: Medium     HST on 10/26/2016 that showed an apnea-hypopnea index (AHI) of 13.0 per hour, 14.2 per hour in supine sleep position.  There was also evidence of mild sleep-associated hypoxemia during the HST, he spent in 6.2 minutes with oxygen saturations below 88%, which resolved with CPAP treatment based on the overnight oximetry obtained on 2/20/2018.              Past Medical/Surgical History:  No past medical history on file.  No past surgical history on file.    Social History:  Social History     Socioeconomic History    Marital status:      Spouse name: Not on file    Number of children: Not on file    Years of education: Not on file    Highest education level: Not on file   Occupational History    Not on file   Tobacco Use    Smoking status: Never    Smokeless tobacco: Never   Substance and Sexual Activity    Alcohol use: Yes     Alcohol/week: 15.0 standard drinks of alcohol     Types: 15 Standard drinks or equivalent per  "week    Drug use: No    Sexual activity: Yes   Other Topics Concern    Parent/sibling w/ CABG, MI or angioplasty before 65F 55M? Not Asked   Social History Narrative    Not on file     Social Determinants of Health     Financial Resource Strain: Not on file   Food Insecurity: Not on file   Transportation Needs: Not on file   Physical Activity: Not on file   Stress: Not on file   Social Connections: Not on file   Interpersonal Safety: Not on file   Housing Stability: Not on file       Family History:  No family history on file.    Review of Systems:  A complete review of systems reviewed by me is negative with the exeption of what has been mentioned in the history of present illness.    Physical Examination:  Vitals: Ht 1.676 m (5' 6\")   Wt 86.2 kg (190 lb)   BMI 30.67 kg/m    BMI= Body mass index is 30.67 kg/m .            Data: All pertinent previous laboratory data reviewed       Mami Lopez PA-C 12/4/2023           "

## 2023-12-13 PROBLEM — G47.00 INSOMNIA: Status: ACTIVE | Noted: 2023-12-13

## 2023-12-29 ENCOUNTER — DOCUMENTATION ONLY (OUTPATIENT)
Dept: SLEEP MEDICINE | Facility: CLINIC | Age: 53
End: 2023-12-29
Payer: MEDICAID

## 2023-12-29 DIAGNOSIS — G47.33 OSA (OBSTRUCTIVE SLEEP APNEA): Primary | ICD-10-CM

## 2023-12-29 NOTE — PROGRESS NOTES
Patient was offered choice of vendor and chose [facility].  Patient Ryan Peterson was set up at [facility]  on December 29, 2023. Patient received a Resmed Airsense 11 Pressures were set at  8-16 cm H2O.   Patient s ramp is 5 cm H2O for Auto and FLEX/EPR is 2.  Patient received a Resmed Mask name: AirFit P30i  Pillow mask size Standard, heated tubing and heated humidifier.  Patient has the following compliance requirements: usage only   Niru Kinsey

## 2024-01-02 ENCOUNTER — DOCUMENTATION ONLY (OUTPATIENT)
Dept: SLEEP MEDICINE | Facility: CLINIC | Age: 54
End: 2024-01-02
Payer: MEDICAID

## 2024-01-02 NOTE — PROGRESS NOTES
3 day Sleep therapy management telephone visit    Diagnostic AHI: 13  PSG    Confirmed with patient at time of call- N/A Patient is still interested in STM service       Message left for patient to return call    Order settings:  CPAP MIN CPAP MAX   8 cm H2O 16 cm H2O       Device settings:  CPAP MIN CPAP MAX EPR RESMED SOFT RESPONSE SETTING   8 cm  H20 16 cm  H20 2 OFF       Compliance 100 %    Assessment: Nightly usage over four hours     Patient has the following upcoming sleep appts:      Replacement device: No  STM ordered by provider: Yes     Total time spent on accessing and  interpreting remote patient PAP therapy data  10 minutes    Total time spent counseling, coaching  and reviewing PAP therapy data with patient  1 minutes    [id number] no

## 2024-11-24 ENCOUNTER — HEALTH MAINTENANCE LETTER (OUTPATIENT)
Age: 54
End: 2024-11-24